# Patient Record
Sex: FEMALE | Race: WHITE
[De-identification: names, ages, dates, MRNs, and addresses within clinical notes are randomized per-mention and may not be internally consistent; named-entity substitution may affect disease eponyms.]

---

## 2017-10-26 ENCOUNTER — HOSPITAL ENCOUNTER (EMERGENCY)
Dept: HOSPITAL 10 - FTE | Age: 27
Discharge: HOME | End: 2017-10-26
Payer: MEDICAID

## 2017-10-26 VITALS
RESPIRATION RATE: 18 BRPM | DIASTOLIC BLOOD PRESSURE: 71 MMHG | HEART RATE: 96 BPM | TEMPERATURE: 98.3 F | SYSTOLIC BLOOD PRESSURE: 116 MMHG

## 2017-10-26 VITALS
HEIGHT: 62 IN | WEIGHT: 159.84 LBS | BODY MASS INDEX: 29.41 KG/M2 | HEIGHT: 62 IN | WEIGHT: 159.84 LBS | BODY MASS INDEX: 29.41 KG/M2

## 2017-10-26 DIAGNOSIS — R06.02: ICD-10-CM

## 2017-10-26 DIAGNOSIS — R10.31: ICD-10-CM

## 2017-10-26 DIAGNOSIS — R51: Primary | ICD-10-CM

## 2017-10-26 DIAGNOSIS — M54.9: ICD-10-CM

## 2017-10-26 DIAGNOSIS — R11.0: ICD-10-CM

## 2017-10-26 LAB
ADD UMIC: YES
ALBUMIN SERPL-MCNC: 4.6 G/DL (ref 3.3–4.9)
ALBUMIN/GLOB SERPL: 1.27 {RATIO}
ALP SERPL-CCNC: 94 IU/L (ref 42–121)
ALT SERPL-CCNC: 26 IU/L (ref 13–69)
ANION GAP SERPL CALC-SCNC: 15 MMOL/L (ref 8–16)
AST SERPL-CCNC: 17 IU/L (ref 15–46)
BASOPHILS # BLD AUTO: 0.1 10^3/UL (ref 0–0.1)
BASOPHILS NFR BLD: 0.3 % (ref 0–2)
BILIRUB DIRECT SERPL-MCNC: 0 MG/DL (ref 0–0.2)
BILIRUB SERPL-MCNC: 0.4 MG/DL (ref 0.2–1.3)
BUN SERPL-MCNC: 7 MG/DL (ref 7–20)
CALCIUM SERPL-MCNC: 9.4 MG/DL (ref 8.4–10.2)
CHLORIDE SERPL-SCNC: 106 MMOL/L (ref 97–110)
CO2 SERPL-SCNC: 21 MMOL/L (ref 21–31)
COLOR UR: YELLOW
CREAT SERPL-MCNC: 0.76 MG/DL (ref 0.44–1)
EOSINOPHIL # BLD: 0 10^3/UL (ref 0–0.5)
EOSINOPHIL NFR BLD: 0.1 % (ref 0–7)
ERYTHROCYTE [DISTWIDTH] IN BLOOD BY AUTOMATED COUNT: 11.9 % (ref 11.5–14.5)
GLOBULIN SER-MCNC: 3.6 G/DL (ref 1.3–3.2)
GLUCOSE SERPL-MCNC: 99 MG/DL (ref 70–220)
GLUCOSE UR STRIP-MCNC: NEGATIVE MG/DL
HCT VFR BLD CALC: 38.6 % (ref 37–47)
HGB BLD-MCNC: 13.7 G/DL (ref 12–16)
KETONES UR STRIP.AUTO-MCNC: (no result) MG/DL
LYMPHOCYTES # BLD AUTO: 1.7 10^3/UL (ref 0.8–2.9)
LYMPHOCYTES NFR BLD AUTO: 10.4 % (ref 15–51)
MCH RBC QN AUTO: 32.2 PG (ref 29–33)
MCHC RBC AUTO-ENTMCNC: 35.5 G/DL (ref 32–37)
MCV RBC AUTO: 90.6 FL (ref 82–101)
MONOCYTES # BLD: 1 10^3/UL (ref 0.3–0.9)
MONOCYTES NFR BLD: 6.2 % (ref 0–11)
MUCOUS THREADS #/AREA URNS HPF: (no result) /HPF
NEUTROPHILS # BLD: 13.4 10^3/UL (ref 1.6–7.5)
NEUTROPHILS NFR BLD AUTO: 82.6 % (ref 39–77)
NITRITE UR QL STRIP.AUTO: NEGATIVE MG/DL
NRBC # BLD MANUAL: 0 10^3/UL (ref 0–0)
NRBC BLD AUTO-RTO: 0 /100WBC (ref 0–0)
PLATELET # BLD: 247 10^3/UL (ref 140–415)
PMV BLD AUTO: 10.1 FL (ref 7.4–10.4)
POTASSIUM SERPL-SCNC: 4.1 MMOL/L (ref 3.5–5.1)
PROT SERPL-MCNC: 8.2 G/DL (ref 6.1–8.1)
RBC # BLD AUTO: 4.26 10^6/UL (ref 4.2–5.4)
RBC # UR AUTO: (no result) MG/DL
SODIUM SERPL-SCNC: 138 MMOL/L (ref 135–144)
SQUAMOUS #/AREA URNS HPF: (no result) /HPF
UR ASCORBIC ACID: NEGATIVE MG/DL
UR BILIRUBIN (DIP): NEGATIVE MG/DL
UR CLARITY: CLEAR
UR PH (DIP): 6 (ref 5–9)
UR RBC: 32 /HPF (ref 0–5)
UR SPECIFIC GRAVITY (DIP): 1.02 (ref 1–1.03)
UR TOTAL PROTEIN (DIP): NEGATIVE MG/DL
UROBILINOGEN UR STRIP-ACNC: NEGATIVE MG/DL
WBC # BLD AUTO: 16.2 10^3/UL (ref 4.8–10.8)
WBC # UR STRIP: NEGATIVE LEU/UL

## 2017-10-26 PROCEDURE — 80053 COMPREHEN METABOLIC PANEL: CPT

## 2017-10-26 PROCEDURE — 96375 TX/PRO/DX INJ NEW DRUG ADDON: CPT

## 2017-10-26 PROCEDURE — 81001 URINALYSIS AUTO W/SCOPE: CPT

## 2017-10-26 PROCEDURE — 74176 CT ABD & PELVIS W/O CONTRAST: CPT

## 2017-10-26 PROCEDURE — 85025 COMPLETE CBC W/AUTO DIFF WBC: CPT

## 2017-10-26 PROCEDURE — 96374 THER/PROPH/DIAG INJ IV PUSH: CPT

## 2017-10-26 PROCEDURE — 83690 ASSAY OF LIPASE: CPT

## 2017-10-26 PROCEDURE — 36415 COLL VENOUS BLD VENIPUNCTURE: CPT

## 2017-10-26 NOTE — RADRPT
PROCEDURE:   CT ABDOMEN AND PELVIS WITHOUT CONTRAST. 

 

CLINICAL INDICATION:   Abdominal pain and right lower quadrant tenderness

 

TECHNIQUE:   CT scan of the abdomen and pelvis without contrast was performed on a multidetector hig
h-resolution CT scanner. The patient was scanned without intravenous contrast.  Coronal and sagittal
 reformatted images were obtained from the axial source images. Images were reviewed on a high-resol
[email or web address] PACS workstation. The total exam CTDI equals 9.3 mGy and the total exam DLP equals 537.3 mGy-c
m.

 

One or more of the following dose reduction techniques were used:

Automated exposure control.

Adjustment of the mA and/or kV according to patient size.

Use of iterative reconstruction technique.

 

COMPARISON:   None 

 

FINDINGS:

 

CT abdomen:

 

The lung bases are clear. The heart size is within limits. There is no significant pericardial effus
ion.

 

Hepatic morphology is within normal limits. No gross contour deforming masses. The gallbladder is wi
thin normal limits. No evidence of intrahepatic or extrahepatic biliary dilatation.

 

 

The spleen and pancreas are within normal limits.

 

Both adrenal glands are within normal limits.

 

Both kidneys are in normal anatomic position. No evidence of obstruction or hydronephrosis. No gross
 renal/ureteric calculi.

 

The visualized GI tract demonstrate normal caliber loops of small and large bowel. No evidence of marivel
wel obstruction. The appendix is within normal limits.

 

The unenhanced aorta is unremarkable. No significant retroperitoneal lymphadenopathy.

 

 

CT pelvis:

 

The bladder is within limits. The rectosigmoid colon is within normal limits. No significant free fl
uid. No significant pelvic lymphadenopathy

 

The uterus appears to be within normal limits.

 

The visualized osseous structures appear to be within normal limits.

 

IMPRESSION:

 

1. No evidence of acute intra-abdominal/pelvic inflammatory process. No evidence of bowel obstructio
n. The appendix is within normal limits.

2. No evidence of free fluid or free air. No gross focal fluid collections.

3. No gross renal/ureteric calculi. No obstruction hydronephrosis.

4. Otherwise, unremarkable unenhanced CT scan of the abdomen/pelvis.

 

RPTAT: AAPP

_____________________________________________ 

Physician Ruben           Date    Time 

Electronically viewed and signed by Physician Ruben on 10/26/2017 09:53 

 

D:  10/26/2017 09:53  T:  10/26/2017 09:53

ROSA M/

## 2017-10-26 NOTE — ERD
ER Documentation


Chief Complaint


Chief Complaint


ha back pain and sob





HPI


Otherwise healthy 27-year-old female presents with the chief complaints of back 

pain, body aches, nausea and abdominal pain.  No vomiting.  Denies neck 

stiffness, cough, taken medications to relieve the symptoms, similar symptoms 

in past.  No aggravating factors.  No alleviating factors.  Patient has no 

other complaints and describes no other associated manifestations.  Nursing 

notes are not consistent with history given.  Further care was taken to obtain 

the correct history.





ROS


All systems reviewed and are negative except as per history of present illness.





Medications


Home Meds


Active Scripts


Oseltamivir Phosphate* (Tamiflu*) 75 Mg Capsule, 75 MG PO BID for 5 Days, CAP


   Prov:KARMA HYDE PA-C         10/26/17


Acyclovir* (Acyclovir*) 400 Mg Tablet, 400 MG PO QID for 7 Days, TAB


   Prov:ALESSANDRO LEÓN PA-C         16


Sulfamethoxazole-Trimethoprim* (Bactrim* DS) 800-160 Mg Tab, 1 TAB PO BID for 7 

Days, TAB


   Prov:ALESSANDRO LEÓN PA-C         16


Phenazopyridine Hcl* (Pyridium*) 200 Mg Tab, 200 MG PO TID Y for DYSURIA, #6 TAB


   Prov:HUSSEIN DODGE MD         8/18/15


Fluconazole* (Diflucan*) 150 Mg Tablet, 150 MG PO Q WEEK, #2 TAB


   TAKE ONE NOW. REPEAT AFTER 1 WEEK


   Prov:HUSSEIN DODGE MD         8/18/15


Acetaminophen-Codeine* (Acetaminophen-Cod #3*) 300-30 Mg Tab, 1 TAB PO Q4H Y 

for PAIN, #10 TAB


   Prov:HUSSEIN DODGE MD         8/18/15


Ciprofloxacin Hcl* (Ciprofloxacin Hcl*) 500 Mg Tablet, 500 MG PO BID for 5 Days

, TAB


   Prov:HUSSEIN DODGE MD         8/18/15


Reported Medications


Ibuprofen (Advil) 200 Mg Tablet, PO


   13





Allergies


Allergies:  


Coded Allergies:  


     No Known Allergy (Verified , 10/26/17)





PMhx/Soc


History of Surgery:  Yes ( X2  & )


Anesthesia Reaction:  No


Hx Neurological Disorder:  No


Hx Respiratory Disorders:  No


Hx Cardiac Disorders:  No


Hx Psychiatric Problems:  No


Hx Miscellaneous Medical Probl:  No


Hx Alcohol Use:  No


Hx Substance Use:  No


Hx Tobacco Use:  No





Physical Exam


Vitals





Vital Signs








  Date Time  Temp Pulse Resp B/P Pulse Ox O2 Delivery O2 Flow Rate FiO2


 


10/26/17 07:28 101.3 107 18 127/78 99   








Physical Exam


Const:  27-year-old female mild distress


Head:   Atraumatic 


Eyes:    Normal Conjunctiva


ENT:    Normal External Ears, Nose and Mouth.


Neck:               No lymphadenopathy or other masses palpated.Full range of 

motion..~ No meningismus.


Resp:    Clear to auscultation bilaterally


Cardio:    Regular rate and rhythm, no murmurs.  Cap refill less than 2 

seconds.  Radial pulses 2+ bilaterally.


Abd:    Mild mid abdomen to right lower quadrant tenderness.  Soft, non 

distended. Normal bowel sounds


Skin:    No petechiae or rashes


Back:    No midline or flank tenderness


Ext:    No cyanosis, or edema


Neur:    Awake and alert


Psych:    Normal Mood and Affect


Result Diagram:  


10/26/17 0831                                                                  

              10/26/17 0831





Results 24 hrs





 Laboratory Tests








Test


  10/26/17


08:31 10/26/17


08:33


 


White Blood Count 16.210^3/ul  


 


Red Blood Count 4.2610^6/ul  


 


Hemoglobin 13.7g/dl  


 


Hematocrit 38.6%  


 


Mean Corpuscular Volume 90.6fl  


 


Mean Corpuscular Hemoglobin 32.2pg  


 


Mean Corpuscular Hemoglobin


Concent 35.5g/dl 


  


 


 


Red Cell Distribution Width 11.9%  


 


Platelet Count 34967^3/UL  


 


Mean Platelet Volume 10.1fl  


 


Neutrophils % 82.6%  


 


Lymphocytes % 10.4%  


 


Monocytes % 6.2%  


 


Eosinophils % 0.1%  


 


Basophils % 0.3%  


 


Nucleated Red Blood Cells % 0.0/100WBC  


 


Neutrophils # 13.410^3/ul  


 


Lymphocytes # 1.710^3/ul  


 


Monocytes # 1.010^3/ul  


 


Eosinophils # 0.010^3/ul  


 


Basophils # 0.110^3/ul  


 


Nucleated Red Blood Cells # 0.010^3/ul  


 


Sodium Level 138mmol/L  


 


Potassium Level 4.1mmol/L  


 


Chloride Level 106mmol/L  


 


Carbon Dioxide Level 21mmol/L  


 


Anion Gap 15  


 


Blood Urea Nitrogen 7mg/dl  


 


Creatinine 0.76mg/dl  


 


Glucose Level 99mg/dl  


 


Calcium Level 9.4mg/dl  


 


Total Bilirubin 0.4mg/dl  


 


Direct Bilirubin 0.00mg/dl  


 


Indirect Bilirubin 0.4mg/dl  


 


Aspartate Amino Transf


(AST/SGOT) 17IU/L 


  


 


 


Alanine Aminotransferase


(ALT/SGPT) 26IU/L 


  


 


 


Alkaline Phosphatase 94IU/L  


 


Total Protein 8.2g/dl  


 


Albumin 4.6g/dl  


 


Globulin 3.60g/dl  


 


Albumin/Globulin Ratio 1.27  


 


Lipase 44U/L  


 


Urine Color  YELLOW 


 


Urine Clarity  CLEAR 


 


Urine pH  6.0 


 


Urine Specific Gravity  1.018 


 


Urine Ketones  TRACEmg/dL 


 


Urine Nitrite  NEGATIVEmg/dL 


 


Urine Bilirubin  NEGATIVEmg/dL 


 


Urine Urobilinogen  NEGATIVEmg/dL 


 


Urine Leukocyte Esterase  NEGATIVELeu/ul 


 


Urine Microscopic RBC  32/HPF 


 


Urine Microscopic WBC  1/HPF 


 


Urine Squamous Epithelial


Cells 


  FEW/HPF 


 


 


Urine Mucus  FEW/HPF 


 


Urine Hemoglobin  3+mg/dL 


 


Urine Glucose  NEGATIVEmg/dL 


 


Urine Total Protein  NEGATIVEmg/dl 








 Current Medications








 Medications


  (Trade)  Dose


 Ordered  Sig/Krishna


 Route


 PRN Reason  Start Time


 Stop Time Status Last Admin


Dose Admin


 


 Morphine Sulfate


  (morphine)  4 mg  ONCE  STAT


 IV


   10/26/17 08:03


 10/26/17 08:09 DC 10/26/17 08:39


 


 


 Ondansetron HCl


  (Zofran Inj)  4 mg  ONCE  STAT


 IV


   10/26/17 08:03


 10/26/17 08:09 DC 10/26/17 08:39


 


 


 Acetaminophen


  (Tylenol Tab)  650 mg  ONCE  ONCE


 PO


   10/26/17 10:00


 10/26/17 10:02 DC 10/26/17 10:06


 











Procedures/MDM


27-year-old female presenting with a chief complaint of headache, body aches, 

abdominal pain and nausea without vomiting worsening over the past 1-2 days.  

Has not taken any medication to relieve symptoms.  Labs were obtained and 

revealed leukocytosis with left shift.  Urinalysis showed 1 white blood cell, 

few epithelial cells, increased ketones.  Negative nitrites and leukocyte 

esterase.CT was obtained read by the radiologist given the following impression

: Unremarkable.


This time I will suspicion for meningitis, pyelonephritis, pancreatitis, 

cholangitis, cholecystitis, mesenteric ischemia, pregnancy related pathologies, 

appendicitis, ovarian torsion, or other acute abdomen.  Most likely diagnosis 

is flulike symptoms versus abdominal pain of unknown etiology.  Patient was 

given Tamiflu.  Discharge instructions return precautions.  I have spoke with 

the patient regarding their condition and future management. They have verbally 

responded that they understand their status and treatment plan. The patients 

vitals are stable, and their current condition is appropriate for discharge. 

The patient will be given discharge instructions with return precautions.





Discharge medications: Tamiflu





Departure


Diagnosis:  


 Primary Impression:  


 Influenza-like symptoms


Condition:  Stable


Patient Instructions:  Influenza (Adult)





Additional Instructions:  


Follow up with your PCP within the next 1-3 days for a more thorough evaluation 

and a possible referral to a specialist. Return the the emergency department 

immediately if symptoms worsen or change. If you have any questions regarding 

medications, ask your pharmacist or us before you leave. If any adverse 

reactions occur while taking your medications, discontinue the treatment and 

return to the emergency department immediately.  Take your medications as 

directed, and complete the entire course of treatment.











KARMA HYDE PA-C Oct 26, 2017 10:35

## 2019-01-29 ENCOUNTER — HOSPITAL ENCOUNTER (EMERGENCY)
Dept: HOSPITAL 91 - FTE | Age: 29
Discharge: HOME | End: 2019-01-29
Payer: SELF-PAY

## 2019-01-29 DIAGNOSIS — J34.89: Primary | ICD-10-CM

## 2019-01-29 PROCEDURE — 96372 THER/PROPH/DIAG INJ SC/IM: CPT

## 2019-01-29 PROCEDURE — 99284 EMERGENCY DEPT VISIT MOD MDM: CPT

## 2019-01-29 RX ADMIN — KETOROLAC TROMETHAMINE 1 MG: 30 INJECTION, SOLUTION INTRAMUSCULAR at 10:15

## 2019-01-29 RX ADMIN — ONDANSETRON 1 MG: 4 TABLET, ORALLY DISINTEGRATING ORAL at 10:15
